# Patient Record
Sex: FEMALE | URBAN - METROPOLITAN AREA
[De-identification: names, ages, dates, MRNs, and addresses within clinical notes are randomized per-mention and may not be internally consistent; named-entity substitution may affect disease eponyms.]

---

## 2022-04-20 ENCOUNTER — OFFICE VISIT (OUTPATIENT)
Dept: URBAN - METROPOLITAN AREA CLINIC 85 | Facility: CLINIC | Age: 82
End: 2022-04-20
Payer: MEDICARE

## 2022-04-20 DIAGNOSIS — H25.813 COMBINED FORMS OF AGE-RELATED CATARACT, BILATERAL: ICD-10-CM

## 2022-04-20 DIAGNOSIS — H04.123 TEAR FILM INSUFFICIENCY OF BILATERAL LACRIMAL GLANDS: Primary | ICD-10-CM

## 2022-04-20 DIAGNOSIS — H11.013 AMYLOID PTERYGIUM, BILATERAL: ICD-10-CM

## 2022-04-20 PROCEDURE — 92004 COMPRE OPH EXAM NEW PT 1/>: CPT | Performed by: OPTOMETRIST

## 2022-04-20 ASSESSMENT — VISUAL ACUITY
OD: 20/40
OS: 20/20

## 2022-04-20 ASSESSMENT — INTRAOCULAR PRESSURE
OD: 14
OS: 14

## 2022-04-20 NOTE — IMPRESSION/PLAN
Impression: Tear film insufficiency of bilateral lacrimal glands: H04.123. Plan: Discussed dry eye diagnosis in detail. Recommend Systane Complete QID OU. Discussed prescription medication options such as Restasis and Leti Corpus in the future.

## 2022-04-20 NOTE — IMPRESSION/PLAN
Impression: Combined forms of age-related cataract, bilateral: H25.813. Plan: Discussed findings. Discussed option of CE/IOL OU. Patient understands cataract effect on vision. Patient defers to have  CE w/IOL consult with Dr. Greg Savage at this time. Continue to monitor. RTC 1 year CEE or ASAP if decreased VA or pain.

## 2022-04-20 NOTE — IMPRESSION/PLAN
Impression: Amyloid pterygium, bilateral: H11.013. Plan: Discussed diagnosis in detail with patient.  Recommend Tears and UV protection